# Patient Record
Sex: MALE
[De-identification: names, ages, dates, MRNs, and addresses within clinical notes are randomized per-mention and may not be internally consistent; named-entity substitution may affect disease eponyms.]

---

## 2022-07-30 ENCOUNTER — TELEPHONE ENCOUNTER (OUTPATIENT)
Age: 57
End: 2022-07-30

## 2022-07-31 ENCOUNTER — TELEPHONE ENCOUNTER (OUTPATIENT)
Age: 57
End: 2022-07-31

## 2025-01-27 ENCOUNTER — DASHBOARD ENCOUNTERS (OUTPATIENT)
Age: 60
End: 2025-01-27

## 2025-01-27 ENCOUNTER — OFFICE VISIT (OUTPATIENT)
Dept: URBAN - METROPOLITAN AREA CLINIC 60 | Facility: CLINIC | Age: 60
End: 2025-01-27
Payer: COMMERCIAL

## 2025-01-27 VITALS
BODY MASS INDEX: 24.89 KG/M2 | HEIGHT: 68 IN | SYSTOLIC BLOOD PRESSURE: 118 MMHG | WEIGHT: 164.2 LBS | OXYGEN SATURATION: 97 % | HEART RATE: 74 BPM | DIASTOLIC BLOOD PRESSURE: 64 MMHG | TEMPERATURE: 98.7 F

## 2025-01-27 DIAGNOSIS — K59.09 ACUTE CONSTIPATION IN CHILDHOOD: ICD-10-CM

## 2025-01-27 DIAGNOSIS — K21.9 CHRONIC GERD: ICD-10-CM

## 2025-01-27 PROBLEM — 266435005: Status: ACTIVE | Noted: 2025-01-27

## 2025-01-27 PROBLEM — 235595009: Status: ACTIVE | Noted: 2025-01-27

## 2025-01-27 PROBLEM — 14760008: Status: ACTIVE | Noted: 2025-01-27

## 2025-01-27 PROCEDURE — 99204 OFFICE O/P NEW MOD 45 MIN: CPT

## 2025-01-27 RX ORDER — OMEPRAZOLE 20 MG/1
1 CAPSULE 1/2 TO 1 HOUR BEFORE MORNING MEAL CAPSULE, DELAYED RELEASE ORAL ONCE A DAY
Qty: 30 | Refills: 0 | OUTPATIENT
Start: 2025-01-27

## 2025-01-27 RX ORDER — POLYETHYLENE GLYCOL 3350 17 G/DOSE
1 SCOOP MIXED WITH 8 OUNCES OF FLUID POWDER (GRAM) ORAL ONCE A DAY
Qty: 1 | Refills: 1 | OUTPATIENT
Start: 2025-01-27 | End: 2025-03-28

## 2025-01-27 NOTE — HPI-TODAY'S VISIT:
1/25 This is a 49 years old male patient seen in the office visit today in good general state patient complaining about some GERD symptoms, report he usually has heartburn after he drinks coffee or drink alcohol and spicy food, patient is not taking any medication, also patient complaining about constipation reports not drink too much water during the day he does not like vegetables or fruits, patient was educated on GERD diet and how to avoid trigger foods, patient was educated about constipation diarrhea how important fiber and drink plenty of water during the day.Patient is without any abdominal complaints. No dysphagia, regurgitation, unintentional weight loss, nausea, vomiting, hematemesis or melena. Blood per rectum. No complaints of bloating. No jaundice, icterus. There have been no new complaints of cough, fever, joint pain or muscle aches. Will start MiraLAX twice a day, and will start omeprazole 20 mg once a day, patient last colonoscopy was in 2018 and normal not result recommended next colonoscopy in 10 years.   If patient does improve symptoms will recommend an EGD.

## 2025-02-20 ENCOUNTER — ERX REFILL RESPONSE (OUTPATIENT)
Dept: URBAN - METROPOLITAN AREA CLINIC 60 | Facility: CLINIC | Age: 60
End: 2025-02-20

## 2025-02-20 RX ORDER — OMEPRAZOLE 20 MG/1
TAKE 1 CAPSULE BY MOUTH ONCE DAILY 30 MINUTES TO 1 HOUR BEFORE MORNING MEAL CAPSULE, DELAYED RELEASE ORAL
Qty: 30 CAPSULE | Refills: 0 | OUTPATIENT

## 2025-02-20 RX ORDER — OMEPRAZOLE 20 MG/1
1 CAPSULE 1/2 TO 1 HOUR BEFORE MORNING MEAL CAPSULE, DELAYED RELEASE ORAL ONCE A DAY
Qty: 30 | Refills: 0 | OUTPATIENT

## 2025-03-28 ENCOUNTER — TELEPHONE ENCOUNTER (OUTPATIENT)
Dept: URBAN - METROPOLITAN AREA CLINIC 60 | Facility: CLINIC | Age: 60
End: 2025-03-28

## 2025-03-28 RX ORDER — OMEPRAZOLE 20 MG/1
TAKE 1 CAPSULE BY MOUTH ONCE DAILY 30 MINUTES TO 1 HOUR BEFORE MORNING MEAL CAPSULE, DELAYED RELEASE ORAL ONCE A DAY
Qty: 90 | Refills: 3